# Patient Record
Sex: MALE | Race: WHITE
[De-identification: names, ages, dates, MRNs, and addresses within clinical notes are randomized per-mention and may not be internally consistent; named-entity substitution may affect disease eponyms.]

---

## 2018-05-16 ENCOUNTER — HOSPITAL ENCOUNTER (EMERGENCY)
Dept: HOSPITAL 54 - ER | Age: 38
Discharge: HOME | End: 2018-05-16
Payer: SELF-PAY

## 2018-05-16 VITALS — WEIGHT: 315 LBS | HEIGHT: 69 IN | BODY MASS INDEX: 46.65 KG/M2

## 2018-05-16 VITALS — DIASTOLIC BLOOD PRESSURE: 90 MMHG | SYSTOLIC BLOOD PRESSURE: 176 MMHG

## 2018-05-16 DIAGNOSIS — I10: ICD-10-CM

## 2018-05-16 DIAGNOSIS — E11.9: ICD-10-CM

## 2018-05-16 DIAGNOSIS — E66.01: ICD-10-CM

## 2018-05-16 DIAGNOSIS — M25.551: Primary | ICD-10-CM

## 2018-05-16 PROCEDURE — 99284 EMERGENCY DEPT VISIT MOD MDM: CPT

## 2018-05-16 PROCEDURE — 73502 X-RAY EXAM HIP UNI 2-3 VIEWS: CPT

## 2018-05-16 PROCEDURE — A4606 OXYGEN PROBE USED W OXIMETER: HCPCS

## 2018-05-16 PROCEDURE — 73551 X-RAY EXAM OF FEMUR 1: CPT

## 2018-05-16 PROCEDURE — Z7610: HCPCS

## 2018-05-16 NOTE — NUR
PT BB SELF FROM HOME C/O OF R SIDED HIP PAIN S/P GETTING OUT OF HIS CAR 2 HOURS 
PTA. PT STATES HE HAD A JOSE PLACED 17 YEARS AGO ON THE R SIDE. NO SWELLING OR 
REDNESS NOTED ON SURGICAL SITE. PT STATES "SHARP PAIN 10/10 NON RADIATING. PT 
IS AAOX4. PT AMBULATED WITH STEADY GAIT TO ER 12. SKIN WNL. NO S/S OF ACUTE 
DISTRESS NOTED. RESP EVEN AND UNLABORED. VSS. PT SAFETY AND COMFRTO MEASURES IN 
PLACED. PT PLACED ON MONITOR AND POX. AWAITING MD FOR EVAL.

## 2018-05-16 NOTE — NUR
PT REFUSED PAIN MEDICATIONS AND STATES  "PERCOCET WORKS BETTER FOR ME". MD MADE 
AWARE. MD CHANGING ORDER.